# Patient Record
Sex: FEMALE | Race: WHITE | NOT HISPANIC OR LATINO | Employment: FULL TIME | URBAN - METROPOLITAN AREA
[De-identification: names, ages, dates, MRNs, and addresses within clinical notes are randomized per-mention and may not be internally consistent; named-entity substitution may affect disease eponyms.]

---

## 2023-08-09 ENCOUNTER — OFFICE VISIT (OUTPATIENT)
Dept: PRIMARY CARE | Facility: CLINIC | Age: 58
End: 2023-08-09
Payer: COMMERCIAL

## 2023-08-09 VITALS
TEMPERATURE: 98.3 F | RESPIRATION RATE: 16 BRPM | BODY MASS INDEX: 28.93 KG/M2 | HEIGHT: 66 IN | HEART RATE: 61 BPM | SYSTOLIC BLOOD PRESSURE: 127 MMHG | OXYGEN SATURATION: 97 % | WEIGHT: 180 LBS | DIASTOLIC BLOOD PRESSURE: 67 MMHG

## 2023-08-09 DIAGNOSIS — R21 RASH: ICD-10-CM

## 2023-08-09 DIAGNOSIS — H60.501 ACUTE OTITIS EXTERNA OF RIGHT EAR, UNSPECIFIED TYPE: Primary | ICD-10-CM

## 2023-08-09 DIAGNOSIS — H66.90 ACUTE OTITIS MEDIA, UNSPECIFIED OTITIS MEDIA TYPE: ICD-10-CM

## 2023-08-09 PROCEDURE — 1036F TOBACCO NON-USER: CPT | Performed by: NURSE PRACTITIONER

## 2023-08-09 PROCEDURE — 99214 OFFICE O/P EST MOD 30 MIN: CPT | Performed by: NURSE PRACTITIONER

## 2023-08-09 RX ORDER — CIPROFLOXACIN AND DEXAMETHASONE 3; 1 MG/ML; MG/ML
4 SUSPENSION/ DROPS AURICULAR (OTIC) 2 TIMES DAILY
Qty: 7.5 ML | Refills: 0 | Status: SHIPPED | OUTPATIENT
Start: 2023-08-09 | End: 2023-08-16

## 2023-08-09 RX ORDER — AMOXICILLIN AND CLAVULANATE POTASSIUM 875; 125 MG/1; MG/1
875 TABLET, FILM COATED ORAL 2 TIMES DAILY
Qty: 20 TABLET | Refills: 0 | Status: SHIPPED | OUTPATIENT
Start: 2023-08-09 | End: 2023-08-25 | Stop reason: ALTCHOICE

## 2023-08-09 ASSESSMENT — ENCOUNTER SYMPTOMS
EYE PAIN: 0
FEVER: 0
HEADACHES: 0
SHORTNESS OF BREATH: 0
DIZZINESS: 0
SINUS PAIN: 0
LIGHT-HEADEDNESS: 0
DIAPHORESIS: 0
APNEA: 0
EYE DISCHARGE: 0
DIARRHEA: 0
ACTIVITY CHANGE: 0
EYE ITCHING: 0
ADENOPATHY: 0
STRIDOR: 0
CHOKING: 0
UNEXPECTED WEIGHT CHANGE: 0
RHINORRHEA: 0
ABDOMINAL PAIN: 0
WEAKNESS: 0
SINUS PRESSURE: 0
PHOTOPHOBIA: 0
CHEST TIGHTNESS: 0
EYE REDNESS: 0
COUGH: 0
NUMBNESS: 0
FACIAL SWELLING: 0
TROUBLE SWALLOWING: 0
CHILLS: 0
APPETITE CHANGE: 0
PALPITATIONS: 0
FATIGUE: 0
VOMITING: 0
VOICE CHANGE: 0
SORE THROAT: 0
BRUISES/BLEEDS EASILY: 0
NAUSEA: 0
WHEEZING: 0

## 2023-08-09 NOTE — PROGRESS NOTES
"Subjective   Patient ID: Jinny Babb is a 58 y.o. female who presents for earache and left foot concern.     HPI  Patient in office with concern of bilateral ear pain, more pronounced on right side x 1.5 weeks. She is also concerned about a dry rash to the top of her left foot. No injury to foot. No other concerns.    Review of Systems   Constitutional:  Negative for activity change, appetite change, chills, diaphoresis, fatigue, fever and unexpected weight change.   HENT:  Positive for ear pain. Negative for congestion, dental problem, ear discharge, facial swelling, hearing loss, mouth sores, nosebleeds, postnasal drip, rhinorrhea, sinus pressure, sinus pain, sneezing, sore throat, tinnitus, trouble swallowing and voice change.    Eyes:  Negative for photophobia, pain, discharge, redness, itching and visual disturbance.   Respiratory:  Negative for apnea, cough, choking, chest tightness, shortness of breath, wheezing and stridor.    Cardiovascular:  Negative for chest pain, palpitations and leg swelling.   Gastrointestinal:  Negative for abdominal pain, diarrhea, nausea and vomiting.   Skin:  Positive for rash.   Neurological:  Negative for dizziness, syncope, weakness, light-headedness, numbness and headaches.   Hematological:  Negative for adenopathy. Does not bruise/bleed easily.       Objective   /67   Pulse 61   Temp 36.8 °C (98.3 °F)   Resp 16   Ht 1.676 m (5' 6\")   Wt 81.6 kg (180 lb)   SpO2 97%   BMI 29.05 kg/m²     Physical Exam  Constitutional:       Appearance: Normal appearance.   HENT:      Head: Normocephalic and atraumatic.      Right Ear: External ear normal. There is no impacted cerumen.      Left Ear: Ear canal and external ear normal. There is no impacted cerumen.      Ears:      Comments: Right and left TM with mild bulging and erythema; right ear canal mildly edematous with mild erythema; no discharge; left ear canal is normal; remainder of ear exam is normal     Nose: " Nose normal. No congestion.      Mouth/Throat:      Mouth: Mucous membranes are moist.      Pharynx: Oropharynx is clear. No oropharyngeal exudate or posterior oropharyngeal erythema.   Eyes:      General: No scleral icterus.        Right eye: No discharge.         Left eye: No discharge.      Extraocular Movements: Extraocular movements intact.      Conjunctiva/sclera: Conjunctivae normal.      Pupils: Pupils are equal, round, and reactive to light.   Cardiovascular:      Rate and Rhythm: Normal rate and regular rhythm.      Pulses: Normal pulses.      Heart sounds: Normal heart sounds.   Pulmonary:      Effort: Pulmonary effort is normal.      Breath sounds: Normal breath sounds.   Musculoskeletal:         General: No swelling, tenderness, deformity or signs of injury.      Cervical back: Neck supple.      Right lower leg: No edema.      Left lower leg: No edema.   Lymphadenopathy:      Cervical: No cervical adenopathy.   Skin:     Coloration: Skin is not jaundiced or pale.      Findings: Rash present. No bruising, erythema or lesion.      Comments: Dermatitic rash to top of left foot   Neurological:      General: No focal deficit present.      Mental Status: She is alert.       Assessment/Plan     Exam findings reviewed with patient. Medications discussed. Abstain from swimming until treatment is completed and asymptomatic. May use OTC Hydrocortisone cream, BID, prn for rash; advocated sun protection. Follow up in 1 week or earlier if no improvement.

## 2023-08-25 ENCOUNTER — OFFICE VISIT (OUTPATIENT)
Dept: PRIMARY CARE | Facility: CLINIC | Age: 58
End: 2023-08-25
Payer: COMMERCIAL

## 2023-08-25 VITALS
HEIGHT: 65 IN | TEMPERATURE: 97.4 F | WEIGHT: 177 LBS | BODY MASS INDEX: 29.49 KG/M2 | SYSTOLIC BLOOD PRESSURE: 127 MMHG | OXYGEN SATURATION: 98 % | RESPIRATION RATE: 18 BRPM | DIASTOLIC BLOOD PRESSURE: 86 MMHG | HEART RATE: 74 BPM

## 2023-08-25 DIAGNOSIS — Z12.31 BREAST CANCER SCREENING BY MAMMOGRAM: ICD-10-CM

## 2023-08-25 DIAGNOSIS — Z00.00 HEALTHCARE MAINTENANCE: Primary | ICD-10-CM

## 2023-08-25 PROBLEM — J32.9 RECURRENT SINUSITIS: Status: ACTIVE | Noted: 2023-08-25

## 2023-08-25 PROBLEM — D22.5 MELANOCYTIC NEVI OF TRUNK: Status: ACTIVE | Noted: 2021-02-15

## 2023-08-25 PROBLEM — Z86.010 HISTORY OF ADENOMATOUS POLYP OF COLON: Status: ACTIVE | Noted: 2023-01-10

## 2023-08-25 PROBLEM — H69.90 EUSTACHIAN TUBE DYSFUNCTION: Status: ACTIVE | Noted: 2023-08-25

## 2023-08-25 PROBLEM — Z86.0101 HISTORY OF ADENOMATOUS POLYP OF COLON: Status: ACTIVE | Noted: 2023-01-10

## 2023-08-25 PROBLEM — D23.5 DYSPLASTIC NEVUS OF TRUNK: Status: ACTIVE | Noted: 2023-01-10

## 2023-08-25 PROBLEM — E78.00 PURE HYPERCHOLESTEROLEMIA: Status: ACTIVE | Noted: 2023-01-10

## 2023-08-25 PROBLEM — L81.4 OTHER MELANIN HYPERPIGMENTATION: Status: ACTIVE | Noted: 2021-02-15

## 2023-08-25 PROBLEM — L82.1 OTHER SEBORRHEIC KERATOSIS: Status: ACTIVE | Noted: 2021-02-15

## 2023-08-25 PROBLEM — R35.0 INCREASED FREQUENCY OF URINATION: Status: ACTIVE | Noted: 2023-08-25

## 2023-08-25 PROBLEM — D18.01 HEMANGIOMA OF SKIN AND SUBCUTANEOUS TISSUE: Status: ACTIVE | Noted: 2021-02-15

## 2023-08-25 PROBLEM — K82.4 POLYP OF GALLBLADDER: Status: ACTIVE | Noted: 2023-01-10

## 2023-08-25 PROBLEM — D22.5 ATYPICAL NEVUS OF BACK: Status: ACTIVE | Noted: 2023-08-25

## 2023-08-25 PROBLEM — M67.431 GANGLION OF WRIST, RIGHT: Status: ACTIVE | Noted: 2017-09-28

## 2023-08-25 PROCEDURE — 1036F TOBACCO NON-USER: CPT | Performed by: FAMILY MEDICINE

## 2023-08-25 PROCEDURE — 99396 PREV VISIT EST AGE 40-64: CPT | Performed by: FAMILY MEDICINE

## 2023-08-25 ASSESSMENT — ENCOUNTER SYMPTOMS
MYALGIAS: 0
DYSURIA: 0
CONSTIPATION: 0
HEADACHES: 0
SINUS PAIN: 0
BACK PAIN: 0
WEAKNESS: 0
DIARRHEA: 0
ABDOMINAL DISTENTION: 0
APPETITE CHANGE: 0
EYE DISCHARGE: 0
CHILLS: 0
HALLUCINATIONS: 0
BLOOD IN STOOL: 0
FEVER: 0
VOMITING: 0
AGITATION: 0
DIZZINESS: 0
DIFFICULTY URINATING: 0
NUMBNESS: 0
SORE THROAT: 0
SHORTNESS OF BREATH: 0
FREQUENCY: 0
NAUSEA: 0
RHINORRHEA: 0
ABDOMINAL PAIN: 0
ADENOPATHY: 0
POLYDIPSIA: 0
COUGH: 0
CONFUSION: 0
PALPITATIONS: 0

## 2023-08-25 NOTE — PROGRESS NOTES
Subjective   Patient ID: Jinny Babb is a 58 y.o. female who presents for Annual Exam.  Well Adult Physical   Patient here for a comprehensive physical exam.The patient reports no problems    Diet: Well balanced     Exercise: Walking every day    Social History    Tobacco Use      Smoking status: Never      Smokeless tobacco: Never    Vaping Use      Vaping Use: Never used    Alcohol use: Yes      Comment: social        Immunization History  Administered            Date(s) Administered    Pfizer Purple Cap SARS-CoV-2                          02/21/2021 03/14/2021      Tdap vaccine, age 10 years and older (BOOSTRIX)                          09/27/2016      Menstrual cycle   When was your last period: 2021       Last mammogram: 2022    Last colon cancer screening: UTD                   Review of Systems   Constitutional:  Negative for appetite change, chills and fever.   HENT:  Negative for ear pain, rhinorrhea, sinus pain and sore throat.    Eyes:  Negative for discharge and visual disturbance.   Respiratory:  Negative for cough and shortness of breath.    Cardiovascular:  Negative for chest pain, palpitations and leg swelling.   Gastrointestinal:  Negative for abdominal distention, abdominal pain, blood in stool, constipation, diarrhea, nausea and vomiting.   Endocrine: Negative for cold intolerance, heat intolerance, polydipsia and polyuria.   Genitourinary:  Negative for difficulty urinating, dysuria and frequency.   Musculoskeletal:  Negative for back pain and myalgias.   Skin:  Negative for rash.   Neurological:  Negative for dizziness, weakness, numbness and headaches.   Hematological:  Negative for adenopathy.   Psychiatric/Behavioral:  Negative for agitation, confusion and hallucinations.        Objective   Physical Exam  Constitutional:       Appearance: Normal appearance.   Cardiovascular:      Rate and Rhythm: Normal rate and regular rhythm.   Pulmonary:      Effort: Pulmonary effort is normal.       Breath sounds: Normal breath sounds.   Chest:   Breasts:     Breasts are symmetrical.      Right: No mass, nipple discharge or skin change.      Left: No mass, nipple discharge or skin change.   Abdominal:      General: Abdomen is flat. Bowel sounds are normal.      Palpations: Abdomen is soft.   Lymphadenopathy:      Upper Body:      Right upper body: No axillary adenopathy.      Left upper body: No axillary adenopathy.   Skin:     General: Skin is warm and dry.   Neurological:      Mental Status: She is alert.   Psychiatric:         Mood and Affect: Mood normal.         Behavior: Behavior normal.         Assessment/Plan   Problem List Items Addressed This Visit    None  Visit Diagnoses       Healthcare maintenance    -  Primary    Relevant Orders    Lipid Panel    Glucose, fasting    HIV 1/2 Antigen/Antibody Screen with Reflex to Confirmation    Breast cancer screening by mammogram        Relevant Orders    BI mammo bilateral screening tomosynthesis        Follow up in 1 year for ISAEL

## 2024-11-14 ENCOUNTER — APPOINTMENT (OUTPATIENT)
Dept: DERMATOLOGY | Facility: CLINIC | Age: 59
End: 2024-11-14
Payer: COMMERCIAL

## 2025-01-23 ENCOUNTER — APPOINTMENT (OUTPATIENT)
Dept: PRIMARY CARE | Facility: CLINIC | Age: 60
End: 2025-01-23
Payer: COMMERCIAL

## 2025-01-23 VITALS
BODY MASS INDEX: 28.51 KG/M2 | HEIGHT: 66 IN | OXYGEN SATURATION: 99 % | RESPIRATION RATE: 16 BRPM | DIASTOLIC BLOOD PRESSURE: 77 MMHG | TEMPERATURE: 97.9 F | SYSTOLIC BLOOD PRESSURE: 138 MMHG | HEART RATE: 60 BPM | WEIGHT: 177.4 LBS

## 2025-01-23 DIAGNOSIS — Z12.31 BREAST CANCER SCREENING BY MAMMOGRAM: ICD-10-CM

## 2025-01-23 DIAGNOSIS — H60.8X3 CHRONIC ECZEMATOUS OTITIS EXTERNA OF BOTH EARS: ICD-10-CM

## 2025-01-23 DIAGNOSIS — Z00.00 HEALTHCARE MAINTENANCE: Primary | ICD-10-CM

## 2025-01-23 DIAGNOSIS — N64.4 BREAST PAIN, LEFT: ICD-10-CM

## 2025-01-23 PROBLEM — J32.9 RECURRENT SINUSITIS: Status: RESOLVED | Noted: 2023-08-25 | Resolved: 2025-01-23

## 2025-01-23 PROBLEM — H60.63 CHRONIC OTITIS EXTERNA OF BOTH EARS: Status: ACTIVE | Noted: 2025-01-23

## 2025-01-23 PROBLEM — H69.90 EUSTACHIAN TUBE DYSFUNCTION: Status: RESOLVED | Noted: 2023-08-25 | Resolved: 2025-01-23

## 2025-01-23 PROBLEM — M67.431 GANGLION OF WRIST, RIGHT: Status: RESOLVED | Noted: 2017-09-28 | Resolved: 2025-01-23

## 2025-01-23 PROBLEM — R35.0 INCREASED FREQUENCY OF URINATION: Status: RESOLVED | Noted: 2023-08-25 | Resolved: 2025-01-23

## 2025-01-23 PROCEDURE — 1036F TOBACCO NON-USER: CPT | Performed by: FAMILY MEDICINE

## 2025-01-23 PROCEDURE — 99396 PREV VISIT EST AGE 40-64: CPT | Performed by: FAMILY MEDICINE

## 2025-01-23 PROCEDURE — 3008F BODY MASS INDEX DOCD: CPT | Performed by: FAMILY MEDICINE

## 2025-01-23 RX ORDER — HYDROCORTISONE AND ACETIC ACID 20.75; 10.375 MG/ML; MG/ML
4 SOLUTION AURICULAR (OTIC) 4 TIMES DAILY PRN
Qty: 10 ML | Refills: 5 | Status: SHIPPED | OUTPATIENT
Start: 2025-01-23 | End: 2026-01-23

## 2025-01-23 ASSESSMENT — ENCOUNTER SYMPTOMS
SORE THROAT: 0
NAUSEA: 0
BACK PAIN: 0
ABDOMINAL PAIN: 0
FREQUENCY: 0
ADENOPATHY: 0
DIARRHEA: 0
DIFFICULTY URINATING: 0
ABDOMINAL DISTENTION: 0
APPETITE CHANGE: 0
MYALGIAS: 0
RHINORRHEA: 0
AGITATION: 0
HEADACHES: 0
CONSTIPATION: 0
FEVER: 0
VOMITING: 0
DIZZINESS: 0
COUGH: 0
HALLUCINATIONS: 0
EYE DISCHARGE: 0
PALPITATIONS: 0
WEAKNESS: 0
POLYDIPSIA: 0
NUMBNESS: 0
BLOOD IN STOOL: 0
SINUS PAIN: 0
CHILLS: 0
CONFUSION: 0
SHORTNESS OF BREATH: 0
DYSURIA: 0

## 2025-01-23 NOTE — ASSESSMENT & PLAN NOTE
Orders:    hydrocortisone-acetic acid (Vosol-HC) otic solution; Administer 4 drops into affected ear(s) 4 times a day as needed (itching).

## 2025-01-23 NOTE — PROGRESS NOTES
Subjective   Patient ID: Jinny Babb is a 59 y.o. female who presents for Annual Exam.  Well Adult Physical   Patient here for a comprehensive physical exam.The patient reports problems -patient states over the last 6 months or so she has had worsening tenderness over her left breast mostly laterally.  She states that a few years ago she was diagnosed with a cyst in the same area but her last mammogram in 2023 was normal.  Patient is also complaining of recurrent ear infections bilaterally with some posterior discomfort.  She states that she actually feels like her ears are wet inside and they are very itchy.      Diet: Well-balanced    Exercise: Walking most days    Social History    Tobacco Use      Smoking status: Former        Packs/day: 0.00        Types: Cigarettes        Quit date:         Years since quittin.0      Smokeless tobacco: Never    Vaping Use      Vaping status: Never Used    Alcohol use: Yes      Comment: social    Drug use: Never        Immunization History  Administered            Date(s) Administered    COVID-19, mRNA, LNP-S, PF, 30 mcg/0.3 mL dose                          2021      Tdap vaccine, age 7 year and older (BOOSTRIX, ADACEL)                          2016      Zoster vaccine, recombinant, adult (SHINGRIX)                          2021      Last pap: Unsure however declined Pap today     Last mammogram: 2023    Last colon cancer screening: Colonoscopy 2015                   Review of Systems   Constitutional:  Negative for appetite change, chills and fever.   HENT:  Negative for ear pain, rhinorrhea, sinus pain and sore throat.    Eyes:  Negative for discharge and visual disturbance.   Respiratory:  Negative for cough and shortness of breath.    Cardiovascular:  Negative for chest pain, palpitations and leg swelling.   Gastrointestinal:  Negative for abdominal distention, abdominal pain, blood in  stool, constipation, diarrhea, nausea and vomiting.   Endocrine: Negative for cold intolerance, heat intolerance, polydipsia and polyuria.   Genitourinary:  Negative for difficulty urinating, dysuria and frequency.   Musculoskeletal:  Negative for back pain and myalgias.   Skin:  Negative for rash.   Neurological:  Negative for dizziness, weakness, numbness and headaches.   Hematological:  Negative for adenopathy.   Psychiatric/Behavioral:  Negative for agitation, confusion and hallucinations.        Objective   Physical Exam  Constitutional:       Appearance: Normal appearance.   Cardiovascular:      Rate and Rhythm: Normal rate and regular rhythm.   Pulmonary:      Effort: Pulmonary effort is normal.      Breath sounds: Normal breath sounds.   Chest:   Breasts:     Breasts are symmetrical.      Right: No mass, nipple discharge or skin change.      Left: No mass, nipple discharge or skin change.          Comments: Tenderness to deep palpation along the left breast and above noted area.  No masses appreciated.    Abdominal:      General: Abdomen is flat. Bowel sounds are normal.      Palpations: Abdomen is soft.   Lymphadenopathy:      Upper Body:      Right upper body: No axillary adenopathy.      Left upper body: No axillary adenopathy.   Skin:     General: Skin is warm and dry.   Neurological:      Mental Status: She is alert.   Psychiatric:         Mood and Affect: Mood normal.         Behavior: Behavior normal.         Assessment & Plan  Chronic eczematous otitis externa of both ears    Orders:    hydrocortisone-acetic acid (Vosol-HC) otic solution; Administer 4 drops into affected ear(s) 4 times a day as needed (itching).    Healthcare maintenance    Orders:    Lipid Panel; Future    Glucose, fasting; Future    HIV 1/2 Antigen/Antibody Screen with Reflex to Confirmation; Future    Hepatitis C antibody; Future    Breast pain, left    Orders:    BI mammo bilateral diagnostic; Future    BI US breast complete  left; Future    Breast cancer screening by mammogram    Orders:    BI mammo bilateral diagnostic; Future       Follow up in 1 year, sooner as needed

## 2025-02-21 ENCOUNTER — HOSPITAL ENCOUNTER (OUTPATIENT)
Dept: RADIOLOGY | Facility: HOSPITAL | Age: 60
Discharge: HOME | End: 2025-02-21
Payer: COMMERCIAL

## 2025-02-21 VITALS — HEIGHT: 66 IN | BODY MASS INDEX: 27.32 KG/M2 | WEIGHT: 170 LBS

## 2025-02-21 DIAGNOSIS — N64.4 BREAST PAIN, LEFT: ICD-10-CM

## 2025-02-21 DIAGNOSIS — Z12.31 BREAST CANCER SCREENING BY MAMMOGRAM: ICD-10-CM

## 2025-02-21 PROCEDURE — 77062 BREAST TOMOSYNTHESIS BI: CPT

## 2025-02-21 PROCEDURE — 76642 ULTRASOUND BREAST LIMITED: CPT | Mod: LT

## 2025-02-21 PROCEDURE — 76981 USE PARENCHYMA: CPT | Mod: LT,RT

## 2025-02-26 ENCOUNTER — HOSPITAL ENCOUNTER (OUTPATIENT)
Dept: RADIOLOGY | Facility: EXTERNAL LOCATION | Age: 60
Discharge: HOME | End: 2025-02-26

## 2026-01-15 ENCOUNTER — APPOINTMENT (OUTPATIENT)
Dept: DERMATOLOGY | Facility: CLINIC | Age: 61
End: 2026-01-15
Payer: COMMERCIAL